# Patient Record
Sex: MALE | Race: OTHER | HISPANIC OR LATINO | Employment: FULL TIME | URBAN - METROPOLITAN AREA
[De-identification: names, ages, dates, MRNs, and addresses within clinical notes are randomized per-mention and may not be internally consistent; named-entity substitution may affect disease eponyms.]

---

## 2017-04-20 ENCOUNTER — APPOINTMENT (OUTPATIENT)
Dept: URGENT CARE | Facility: MEDICAL CENTER | Age: 32
End: 2017-04-20

## 2017-04-20 PROCEDURE — 99284 EMERGENCY DEPT VISIT MOD MDM: CPT

## 2017-04-20 PROCEDURE — G0383 LEV 4 HOSP TYPE B ED VISIT: HCPCS

## 2020-01-25 ENCOUNTER — APPOINTMENT (OUTPATIENT)
Dept: URGENT CARE | Facility: MEDICAL CENTER | Age: 35
End: 2020-01-25
Payer: OTHER MISCELLANEOUS

## 2020-01-25 PROCEDURE — 99283 EMERGENCY DEPT VISIT LOW MDM: CPT | Performed by: PHYSICIAN ASSISTANT

## 2020-01-25 PROCEDURE — G0382 LEV 3 HOSP TYPE B ED VISIT: HCPCS | Performed by: PHYSICIAN ASSISTANT

## 2020-01-30 ENCOUNTER — APPOINTMENT (OUTPATIENT)
Dept: URGENT CARE | Facility: MEDICAL CENTER | Age: 35
End: 2020-01-30
Payer: OTHER MISCELLANEOUS

## 2020-01-30 PROCEDURE — 99213 OFFICE O/P EST LOW 20 MIN: CPT | Performed by: PHYSICIAN ASSISTANT

## 2020-02-07 ENCOUNTER — OCCMED (OUTPATIENT)
Dept: URGENT CARE | Facility: MEDICAL CENTER | Age: 35
End: 2020-02-07
Payer: OTHER MISCELLANEOUS

## 2020-02-07 DIAGNOSIS — M54.2 NECK PAIN: Primary | ICD-10-CM

## 2020-02-07 PROCEDURE — 99213 OFFICE O/P EST LOW 20 MIN: CPT | Performed by: PHYSICIAN ASSISTANT

## 2020-02-14 ENCOUNTER — APPOINTMENT (OUTPATIENT)
Dept: URGENT CARE | Facility: MEDICAL CENTER | Age: 35
End: 2020-02-14
Payer: OTHER MISCELLANEOUS

## 2020-02-14 PROCEDURE — 99213 OFFICE O/P EST LOW 20 MIN: CPT | Performed by: FAMILY MEDICINE

## 2020-02-28 ENCOUNTER — APPOINTMENT (OUTPATIENT)
Dept: URGENT CARE | Facility: MEDICAL CENTER | Age: 35
End: 2020-02-28
Payer: OTHER MISCELLANEOUS

## 2020-02-28 PROCEDURE — 99213 OFFICE O/P EST LOW 20 MIN: CPT | Performed by: FAMILY MEDICINE

## 2020-03-02 ENCOUNTER — APPOINTMENT (OUTPATIENT)
Dept: URGENT CARE | Facility: MEDICAL CENTER | Age: 35
End: 2020-03-02
Payer: OTHER MISCELLANEOUS

## 2020-03-02 PROCEDURE — 99213 OFFICE O/P EST LOW 20 MIN: CPT | Performed by: PHYSICIAN ASSISTANT

## 2020-03-06 ENCOUNTER — APPOINTMENT (OUTPATIENT)
Dept: URGENT CARE | Facility: MEDICAL CENTER | Age: 35
End: 2020-03-06
Payer: OTHER MISCELLANEOUS

## 2020-03-06 PROCEDURE — 99213 OFFICE O/P EST LOW 20 MIN: CPT | Performed by: FAMILY MEDICINE

## 2020-03-12 ENCOUNTER — APPOINTMENT (OUTPATIENT)
Dept: URGENT CARE | Facility: CLINIC | Age: 35
End: 2020-03-12
Payer: OTHER MISCELLANEOUS

## 2020-03-12 PROCEDURE — 99213 OFFICE O/P EST LOW 20 MIN: CPT | Performed by: PHYSICIAN ASSISTANT

## 2020-04-08 ENCOUNTER — OFFICE VISIT (OUTPATIENT)
Dept: URGENT CARE | Facility: MEDICAL CENTER | Age: 35
End: 2020-04-08
Payer: COMMERCIAL

## 2020-04-08 VITALS
RESPIRATION RATE: 16 BRPM | TEMPERATURE: 98.3 F | SYSTOLIC BLOOD PRESSURE: 147 MMHG | OXYGEN SATURATION: 97 % | HEART RATE: 72 BPM | WEIGHT: 185 LBS | DIASTOLIC BLOOD PRESSURE: 98 MMHG | BODY MASS INDEX: 25.06 KG/M2 | HEIGHT: 72 IN

## 2020-04-08 DIAGNOSIS — J02.9 SORE THROAT: Primary | ICD-10-CM

## 2020-04-08 LAB — S PYO AG THROAT QL: NEGATIVE

## 2020-04-08 PROCEDURE — G0382 LEV 3 HOSP TYPE B ED VISIT: HCPCS | Performed by: PHYSICIAN ASSISTANT

## 2020-04-08 PROCEDURE — 87880 STREP A ASSAY W/OPTIC: CPT | Performed by: PHYSICIAN ASSISTANT

## 2020-05-02 ENCOUNTER — OFFICE VISIT (OUTPATIENT)
Dept: URGENT CARE | Facility: MEDICAL CENTER | Age: 35
End: 2020-05-02
Payer: COMMERCIAL

## 2020-05-02 VITALS
WEIGHT: 180 LBS | BODY MASS INDEX: 24.38 KG/M2 | OXYGEN SATURATION: 96 % | RESPIRATION RATE: 16 BRPM | TEMPERATURE: 97.7 F | DIASTOLIC BLOOD PRESSURE: 78 MMHG | HEART RATE: 75 BPM | HEIGHT: 72 IN | SYSTOLIC BLOOD PRESSURE: 118 MMHG

## 2020-05-02 DIAGNOSIS — S39.012A STRAIN OF LUMBAR REGION, INITIAL ENCOUNTER: Primary | ICD-10-CM

## 2020-05-02 PROCEDURE — G0382 LEV 3 HOSP TYPE B ED VISIT: HCPCS | Performed by: PHYSICIAN ASSISTANT

## 2020-05-02 RX ORDER — METHOCARBAMOL 750 MG/1
750 TABLET, FILM COATED ORAL 4 TIMES DAILY PRN
COMMUNITY
Start: 2020-05-01 | End: 2020-06-06 | Stop reason: ALTCHOICE

## 2020-05-02 RX ORDER — IBUPROFEN 600 MG/1
600 TABLET ORAL EVERY 6 HOURS PRN
COMMUNITY
Start: 2020-05-01 | End: 2021-05-01

## 2020-05-02 RX ORDER — LIDOCAINE 4 G/G
1 PATCH TOPICAL DAILY
COMMUNITY
Start: 2020-05-01 | End: 2020-06-06 | Stop reason: ALTCHOICE

## 2020-06-06 ENCOUNTER — OFFICE VISIT (OUTPATIENT)
Dept: URGENT CARE | Facility: MEDICAL CENTER | Age: 35
End: 2020-06-06
Payer: COMMERCIAL

## 2020-06-06 VITALS
HEIGHT: 72 IN | WEIGHT: 190 LBS | OXYGEN SATURATION: 96 % | DIASTOLIC BLOOD PRESSURE: 77 MMHG | HEART RATE: 72 BPM | SYSTOLIC BLOOD PRESSURE: 126 MMHG | TEMPERATURE: 97.3 F | RESPIRATION RATE: 16 BRPM | BODY MASS INDEX: 25.73 KG/M2

## 2020-06-06 DIAGNOSIS — M79.644 PAIN OF FINGER OF RIGHT HAND: Primary | ICD-10-CM

## 2020-06-06 PROCEDURE — G0382 LEV 3 HOSP TYPE B ED VISIT: HCPCS | Performed by: PHYSICIAN ASSISTANT

## 2020-06-06 RX ORDER — PREDNISONE 50 MG/1
50 TABLET ORAL DAILY
Qty: 5 TABLET | Refills: 0 | Status: SHIPPED | OUTPATIENT
Start: 2020-06-06 | End: 2020-06-11

## 2020-06-11 VITALS
DIASTOLIC BLOOD PRESSURE: 80 MMHG | WEIGHT: 190 LBS | SYSTOLIC BLOOD PRESSURE: 125 MMHG | BODY MASS INDEX: 25.73 KG/M2 | HEART RATE: 67 BPM | HEIGHT: 72 IN

## 2020-06-11 DIAGNOSIS — M79.644 PAIN OF FINGER OF RIGHT HAND: ICD-10-CM

## 2020-06-11 PROCEDURE — 99203 OFFICE O/P NEW LOW 30 MIN: CPT | Performed by: EMERGENCY MEDICINE

## 2020-06-11 RX ORDER — METHYLPREDNISOLONE 4 MG/1
TABLET ORAL
Qty: 1 EACH | Refills: 0 | Status: SHIPPED | OUTPATIENT
Start: 2020-06-11

## 2020-06-25 ENCOUNTER — APPOINTMENT (OUTPATIENT)
Dept: RADIOLOGY | Facility: MEDICAL CENTER | Age: 35
End: 2020-06-25
Payer: COMMERCIAL

## 2020-06-25 ENCOUNTER — APPOINTMENT (OUTPATIENT)
Dept: LAB | Facility: CLINIC | Age: 35
End: 2020-06-25
Payer: COMMERCIAL

## 2020-06-25 VITALS — WEIGHT: 190 LBS | HEIGHT: 72 IN | RESPIRATION RATE: 18 BRPM | TEMPERATURE: 97.9 F | BODY MASS INDEX: 25.73 KG/M2

## 2020-06-25 DIAGNOSIS — M79.644 FINGER PAIN, RIGHT: ICD-10-CM

## 2020-06-25 DIAGNOSIS — M79.644 FINGER PAIN, RIGHT: Primary | ICD-10-CM

## 2020-06-25 DIAGNOSIS — M25.541 PAIN INVOLVING JOINT OF FINGER OF RIGHT HAND: ICD-10-CM

## 2020-06-25 LAB
BASOPHILS # BLD AUTO: 0.05 THOUSANDS/ΜL (ref 0–0.1)
BASOPHILS NFR BLD AUTO: 1 % (ref 0–1)
CRP SERPL QL: <3 MG/L
EOSINOPHIL # BLD AUTO: 0.02 THOUSAND/ΜL (ref 0–0.61)
EOSINOPHIL NFR BLD AUTO: 0 % (ref 0–6)
ERYTHROCYTE [DISTWIDTH] IN BLOOD BY AUTOMATED COUNT: 12.5 % (ref 11.6–15.1)
ERYTHROCYTE [SEDIMENTATION RATE] IN BLOOD: 6 MM/HOUR (ref 0–10)
HCT VFR BLD AUTO: 43.8 % (ref 36.5–49.3)
HGB BLD-MCNC: 15 G/DL (ref 12–17)
IMM GRANULOCYTES # BLD AUTO: 0.01 THOUSAND/UL (ref 0–0.2)
IMM GRANULOCYTES NFR BLD AUTO: 0 % (ref 0–2)
LYMPHOCYTES # BLD AUTO: 1.46 THOUSANDS/ΜL (ref 0.6–4.47)
LYMPHOCYTES NFR BLD AUTO: 27 % (ref 14–44)
MCH RBC QN AUTO: 30.4 PG (ref 26.8–34.3)
MCHC RBC AUTO-ENTMCNC: 34.2 G/DL (ref 31.4–37.4)
MCV RBC AUTO: 89 FL (ref 82–98)
MONOCYTES # BLD AUTO: 0.59 THOUSAND/ΜL (ref 0.17–1.22)
MONOCYTES NFR BLD AUTO: 11 % (ref 4–12)
NEUTROPHILS # BLD AUTO: 3.33 THOUSANDS/ΜL (ref 1.85–7.62)
NEUTS SEG NFR BLD AUTO: 61 % (ref 43–75)
NRBC BLD AUTO-RTO: 0 /100 WBCS
PLATELET # BLD AUTO: 255 THOUSANDS/UL (ref 149–390)
PMV BLD AUTO: 8.8 FL (ref 8.9–12.7)
RBC # BLD AUTO: 4.93 MILLION/UL (ref 3.88–5.62)
WBC # BLD AUTO: 5.46 THOUSAND/UL (ref 4.31–10.16)

## 2020-06-25 PROCEDURE — 99204 OFFICE O/P NEW MOD 45 MIN: CPT | Performed by: ORTHOPAEDIC SURGERY

## 2020-06-25 PROCEDURE — 86200 CCP ANTIBODY: CPT

## 2020-06-25 PROCEDURE — 36415 COLL VENOUS BLD VENIPUNCTURE: CPT

## 2020-06-25 PROCEDURE — 86618 LYME DISEASE ANTIBODY: CPT

## 2020-06-25 PROCEDURE — 85025 COMPLETE CBC W/AUTO DIFF WBC: CPT

## 2020-06-25 PROCEDURE — 86140 C-REACTIVE PROTEIN: CPT

## 2020-06-25 PROCEDURE — 86430 RHEUMATOID FACTOR TEST QUAL: CPT

## 2020-06-25 PROCEDURE — 73140 X-RAY EXAM OF FINGER(S): CPT

## 2020-06-25 PROCEDURE — 85652 RBC SED RATE AUTOMATED: CPT

## 2020-06-25 NOTE — PROGRESS NOTES
Chief Complaint     Right index finger pain    History of Present Illness     José Miguel Gallegos is a 30 y o  male who presents the office today for evaluation of his right index finger  I am seeing him in consultation at the request of Stoney Loja MD  Patient states he has had right index finger pain for approximately 2 months now with no known incident of injury  Patient states the pain is at his PIP joint, and denies any clicking, popping, or locking of the finger  He works as a  at [employer] and notes his pain is worse with increased activities  He was prescribed a medrol dose pack and advised to buddy tape his fingers for increased activity  He reports this did not provide him with significant relief  He denies any other joint pains  He denies any weight loss  He denies any numbness and tingling  Patient has had x-rays of his finger in the past with an outside provider but does not have them available for review today  History reviewed  No pertinent past medical history  History reviewed  No pertinent surgical history  No Known Allergies    Current Outpatient Medications on File Prior to Visit   Medication Sig Dispense Refill    ibuprofen (MOTRIN) 600 mg tablet Take 600 mg by mouth every 6 (six) hours as needed      methylPREDNISolone 4 MG tablet therapy pack Use as directed on package 1 each 0     No current facility-administered medications on file prior to visit  Social History     Tobacco Use    Smoking status: Former Smoker    Smokeless tobacco: Never Used   Substance Use Topics    Alcohol use: No    Drug use: No       History reviewed  No pertinent family history  Review of Systems     As stated in the HPI  All other systems were reviewed and are negative  Physical Exam     Temp 97 9 °F (36 6 °C)   Resp 18   Ht 6' (1 829 m)   Wt 86 2 kg (190 lb)   BMI 25 77 kg/m²     GENERAL: This is a well-developed, well-nourished, age-appropriate patient in no acute distress  The patient is alert and oriented x3  Pleasant and cooperative  Eyes: Anicteric sclerae  Extraocular movements appear intact  HENT: Nares are patent with no drainage  Lungs: There is equal chest rise on inspection  Breathing is non-labored with no audible wheezing  Cardiovascular: No cyanosis  No upper extremity lymphadema  Skin: Skin is warm to touch  No obvious skin lesions or rashes other than described below  Neurologic: No ataxia  Psychiatric: Mood and affect are appropriate  Right index finger  Skin is intact  No erythema or ecchymosis noted  Swelling of the PIP joint   Tender to palpate PIP joint and the radial and ulnar aspect of the joint as well as on the volar and dorsal aspect  Nontender to palpate A1 pulley, no active triggering noted  Stable collateral ligament exam with varus and valgus stress  No hyperextension  DPC 0  No flexion contracture  Sensation intact to the ulnar and radial aspect of the digit  Brisk capillary refill noted    Data Review     Results Reviewed     None             Imaging:  X-rays of the right index finger obtained on 06/25/2020 were personally reviewed by me in the office and demonstrate no acute fracture or osseous abnormality     Assessment and Plan      Diagnoses and all orders for this visit:    Finger pain, right  -     XR finger right second digit-index; Future    Pain involving joint of finger of right hand  -     CBC and differential; Future  -     Sedimentation rate, automated; Future  -     C-reactive protein; Future  -     Cyclic citrul peptide antibody, IgG; Future  -     Rheumatoid Factor (IgG); Future  -     Lyme Antibody Profile with reflex to WB; Future           45-year-old male with right index PIP joint pain  Patient and I discussed due to his young age, no mechanism of injury and negative x-rays I would like to order blood work to work him up for other conditions  Patient was amenable to this plan  Labs were ordered today in the office    He may use a Coban wrap for comfort around PIP joint for activity  At this point time he has no restrictions and may continue all activities as tolerated  If his labs come back negative we may consider doing a corticosteroid injection to the joint for pain relief    Will follow up in 2 weeks time for re-evaluation    Follow Up: 2 weeks     To Do Next Visit:  Review labs, potential corticosteroid injections    PROCEDURES PERFORMED:  Procedures  No Procedures performed today    Scribe Attestation    I,:   Indira Garza MA am acting as a scribe while in the presence of the attending physician :        I,:   Mp Patton MD personally performed the services described in this documentation    as scribed in my presence :

## 2020-06-26 LAB
B BURGDOR IGG+IGM SER-ACNC: <0.91 ISR (ref 0–0.9)
RHEUMATOID FACT SER QL LA: NEGATIVE

## 2020-06-27 LAB — CCP IGA+IGG SERPL IA-ACNC: 6 UNITS (ref 0–19)

## 2020-06-29 ENCOUNTER — APPOINTMENT (OUTPATIENT)
Dept: URGENT CARE | Facility: MEDICAL CENTER | Age: 35
End: 2020-06-29
Payer: OTHER MISCELLANEOUS

## 2020-06-29 PROCEDURE — 99284 EMERGENCY DEPT VISIT MOD MDM: CPT | Performed by: FAMILY MEDICINE

## 2020-06-29 PROCEDURE — G0383 LEV 4 HOSP TYPE B ED VISIT: HCPCS | Performed by: FAMILY MEDICINE

## 2020-07-02 ENCOUNTER — APPOINTMENT (OUTPATIENT)
Dept: URGENT CARE | Facility: MEDICAL CENTER | Age: 35
End: 2020-07-02
Payer: OTHER MISCELLANEOUS

## 2020-07-02 PROCEDURE — 99213 OFFICE O/P EST LOW 20 MIN: CPT | Performed by: FAMILY MEDICINE

## 2020-07-09 ENCOUNTER — APPOINTMENT (OUTPATIENT)
Dept: URGENT CARE | Facility: MEDICAL CENTER | Age: 35
End: 2020-07-09
Payer: OTHER MISCELLANEOUS

## 2020-07-09 VITALS — WEIGHT: 190 LBS | HEIGHT: 72 IN | TEMPERATURE: 98.2 F | BODY MASS INDEX: 25.73 KG/M2

## 2020-07-09 DIAGNOSIS — M79.644 FINGER PAIN, RIGHT: Primary | ICD-10-CM

## 2020-07-09 PROCEDURE — 99213 OFFICE O/P EST LOW 20 MIN: CPT | Performed by: ORTHOPAEDIC SURGERY

## 2020-07-09 PROCEDURE — 99213 OFFICE O/P EST LOW 20 MIN: CPT | Performed by: PHYSICIAN ASSISTANT

## 2020-07-09 RX ORDER — METHOCARBAMOL 500 MG/1
500 TABLET, FILM COATED ORAL 2 TIMES DAILY
COMMUNITY
Start: 2020-06-30 | End: 2020-09-30 | Stop reason: SDUPTHER

## 2020-07-09 NOTE — PROGRESS NOTES
Chief Complaint     Right index finger pain    History of Present Illness     Cameron Campos is a 30 y o  male who presents the office today for a follow-up evaluation of his right index finger  Patient states he continues to experience pain at the PIP joint though it is markedly decreased  He has been using a Coban wrap for comfort  He was able to obtain blood work prior to today's visit  Patient states he continues to have ulnar-sided PIP joint pain  He notes his pain has greatly improved since the last visit, however he has not been working due to a back injury  He attributes his decreasing pain to decrease in work activity  He notes today that his range of motion has improved in that he has no pain with making a full fist   He denies any new injuries  He denies any distal paresthesias  No past medical history on file  Past Surgical History:   Procedure Laterality Date    BACK SURGERY         No Known Allergies    Current Outpatient Medications on File Prior to Visit   Medication Sig Dispense Refill    ibuprofen (MOTRIN) 600 mg tablet Take 600 mg by mouth every 6 (six) hours as needed      methocarbamol (ROBAXIN) 500 mg tablet Take 500 mg by mouth 2 (two) times a day      methylPREDNISolone 4 MG tablet therapy pack Use as directed on package (Patient not taking: Reported on 7/9/2020) 1 each 0     No current facility-administered medications on file prior to visit  Social History     Tobacco Use    Smoking status: Former Smoker    Smokeless tobacco: Never Used   Substance Use Topics    Alcohol use: No    Drug use: No       No family history on file  Review of Systems     As stated in the HPI  All other systems were reviewed and are negative  Physical Exam     Temp 98 2 °F (36 8 °C)   Ht 6' (1 829 m)   Wt 86 2 kg (190 lb)   BMI 25 77 kg/m²     GENERAL: This is a well-developed, well-nourished, age-appropriate patient in no acute distress   The patient is alert and oriented x3  Pleasant and cooperative  Eyes: Anicteric sclerae  Extraocular movements appear intact  HENT: Nares are patent with no drainage  Lungs: There is equal chest rise on inspection  Breathing is non-labored with no audible wheezing  Cardiovascular: No cyanosis  No upper extremity lymphadema  Skin: Skin is warm to touch  No obvious skin lesions or rashes other than described below  Neurologic: No ataxia  Psychiatric: Mood and affect are appropriate  Right index finger  Skin intact  No erythema or ecchymosis noted  No swelling noted  Tender to palpate ulnar collateral ligament of PIP joint  Stable to valgus and varus stress  DPC 0  Sensation intact to the ulnar and radial aspect of the digit  Brisk capillary refill noted    Data Review     Results Reviewed     None             Imaging:  None today     Assessment and Plan      Diagnoses and all orders for this visit:    Finger pain, right    Other orders  -     methocarbamol (ROBAXIN) 500 mg tablet; Take 500 mg by mouth 2 (two) times a day           30year-old with right index finger PIP joint pain  We reviewed his lab work today which did not demonstrate any rheumatoid arthritis or other concerning issue  Patient and I discussed that because he has had improvement in his pain with activity modification that we do not need to proceed with corticosteroid injection today  Patient wishes to defer this injection until he is closer to return to work if it is still bothering him  He will follow up with me on an as-needed basis        Follow Up:  Prior to going back to work, overbook if needed for injection only    To Do Next Visit:  Injection of PIP    PROCEDURES PERFORMED:  Procedures  No Procedures performed today     Scribe Attestation    I,:   Willis Clayton MA am acting as a scribe while in the presence of the attending physician :        I,:   Chio Parsons MD personally performed the services described in this documentation    as scribed in my presence :

## 2020-07-22 ENCOUNTER — EVALUATION (OUTPATIENT)
Dept: PHYSICAL THERAPY | Facility: MEDICAL CENTER | Age: 35
End: 2020-07-22
Payer: OTHER MISCELLANEOUS

## 2020-07-22 DIAGNOSIS — M54.6 ACUTE BILATERAL THORACIC BACK PAIN: Primary | ICD-10-CM

## 2020-07-22 PROCEDURE — 97140 MANUAL THERAPY 1/> REGIONS: CPT | Performed by: PHYSICAL THERAPIST

## 2020-07-22 PROCEDURE — 97161 PT EVAL LOW COMPLEX 20 MIN: CPT | Performed by: PHYSICAL THERAPIST

## 2020-07-24 NOTE — PROGRESS NOTES
PT Evaluation     Today's date: 2020  Patient name: Jacqui Whitfield  : 1985  MRN: [id number]  Referring provider: Sunitha Abbott PA-C  Dx:   Encounter Diagnosis     ICD-10-CM    1  Acute bilateral thoracic back pain M54 6                   Assessment  Assessment details: Jacqui Whitfield is a 30 y o  male was evaluated on 2020  for Acute bilateral thoracic back pain  (primary encounter diagnosis)  Jacqui Whitfield has the above listed impairments resulting in functional deficits and negative impact to quality of life  Patient is appropriate for skilled PT intervention to promote maximal return to function and patient specific goals  Patient agrees with outlined treatment plan and all questions were answered to their satisfaction  Impairments: abnormal muscle firing, abnormal muscle tone, abnormal or restricted ROM, impaired physical strength, lacks appropriate home exercise program and pain with function  Understanding of Dx/Px/POC: good   Prognosis: good    Goals  Patient will successfully transition to home exercise program   Patient will be able to manage symptoms independently  Rubina Ngo will report sleeping through night and not waking due to back pain  Patient will return to work activity in 4-6 weeks without limitation in lifting tolerance       Plan  Patient would benefit from: skilled PT  Referral necessary: No  Planned modality interventions: thermotherapy: hydrocollator packs  Planned therapy interventions: home exercise program, manual therapy, neuromuscular re-education, patient education, functional ROM exercises, strengthening, stretching, joint mobilization, graded activity, graded exercise, therapeutic exercise, body mechanics training, motor coordination training and activity modification  Frequency: 2x week  Duration in weeks: 12  Treatment plan discussed with: patient        Subjective Evaluation    History of Present Illness  Mechanism of injury: Jacqui Whitfield is a 30 y o  male presenting to therapy with complaints of mid/lower back pain after lifting on   He reports he lifted and object and his back began tightening up  He denies any lower extremity symptoms, no numbness, no bowel/bladder dysfunction  Has been attempting to manage with muscle relaxers and stretching  Hx of laminectomy 2 years ago at L4  Feels most of discomfort and tightness in mid back with laying on back or when being active  Currently out of work as  due to pain and not being able to fully perform duties  Pain  Current pain ratin  At best pain rating: 3  At worst pain ratin  Quality: dull ache and pressure          Objective     Concurrent Complaints  Negative for night pain, disturbed sleep, bladder dysfunction, bowel dysfunction and saddle (S4) numbness    Postural Observations  Seated posture: fair  Standing posture: fair        Neurological Testing     Sensation     Lumbar   Left   Intact: light touch    Right   Intact: light touch    Reflexes   Left   Patellar (L4): normal (2+)  Achilles (S1): normal (2+)    Right   Patellar (L4): normal (2+)  Achilles (S1): normal (2+)    Active Range of Motion   Cervical/Thoracic Spine       Thoracic    Flexion:  with pain Restriction level: moderate  Extension:  with pain Restriction level: moderate  Left lateral flexion:  Restriction level: minimal  Right lateral flexion:  Restriction level: minimal  Left rotation:  Restriction level: minimal  Right rotation:  Restriction level: minimal    Lumbar   Flexion:  Restriction level: minimal  Extension:  Restriction level: minimal  Left lateral flexion:  Restriction level: minimal  Right lateral flexion:  Restriction level: minimal    Joint Play     Hypomobile: T10, T11, T12, L1 and L2     Tests     Lumbar     Left   Negative crossed SLR, passive SLR and quadrant  Right   Negative crossed SLR, passive SLR and quadrant  Left Hip   Negative KVNG and FADIR       Right Hip   Negative KVNG and ERMIAS         Flowsheet Rows      Most Recent Value   PT/OT G-Codes   Current Score  48   Projected Score  66             Precautions: Hx Laminectomy L4 2018      Manuals 7/22            Seated thoracic distraction thrust  AF                                                   Neuro Re-Ed                                                                                                        Ther Ex             Double knee to chest             Quadruped rocking                                                                                            Ther Activity                                       Gait Training                                       Modalities

## 2020-07-27 ENCOUNTER — OFFICE VISIT (OUTPATIENT)
Dept: PHYSICAL THERAPY | Facility: MEDICAL CENTER | Age: 35
End: 2020-07-27
Payer: OTHER MISCELLANEOUS

## 2020-07-27 DIAGNOSIS — M54.6 ACUTE BILATERAL THORACIC BACK PAIN: Primary | ICD-10-CM

## 2020-07-27 PROCEDURE — 97140 MANUAL THERAPY 1/> REGIONS: CPT | Performed by: PHYSICAL THERAPIST

## 2020-07-27 PROCEDURE — 97112 NEUROMUSCULAR REEDUCATION: CPT | Performed by: PHYSICAL THERAPIST

## 2020-07-27 NOTE — PROGRESS NOTES
Daily Note     Today's date: 2020  Patient name: Luis Felipe Hicks  : 1985  MRN: [id number]  Referring provider: Onel Chicas PA-C  Dx:   Encounter Diagnosis     ICD-10-CM    1  Acute bilateral thoracic back pain M54 6                   Subjective: Pt reports that he was sore after last visit but it only lasted about a day  Stated he has been doing the stretches that are helping a little  Objective: See treatment diary below      Assessment: Tolerated treatment well  Patient demonstrated fatigue post treatment, exhibited good technique with therapeutic exercises and would benefit from continued PT  Pt has moderate tissue irritability in mid back regions with tissue massage and moderate grade joint mobilizations  Pt demonstrates moderate to high muscular fatigue with exercises  Passive scapular mobility is poor and pt had increased irritability around left medial scapular boarder  Progress motor control, muscular endurance, and muscle lengthening exercises  Plan: Continue per plan of care        Precautions: Hx Laminectomy L4 2018      Manuals            Seated thoracic distraction thrust  AF            Scapular mobility   CK           Thoracic Pas  Gr III-IV                        Neuro Re-Ed             Hip hinge   CK            Modified deadlift  Step, yellow KB                                                                             Ther Ex             Double knee to chest  HEP           Quadruped rocking   CK w/ soft tisue mob           Arm extension  Red, 15x 5s hold                                                                            Ther Activity                                       Gait Training                                       Modalities No

## 2020-07-28 ENCOUNTER — APPOINTMENT (OUTPATIENT)
Dept: URGENT CARE | Facility: MEDICAL CENTER | Age: 35
End: 2020-07-28
Payer: OTHER MISCELLANEOUS

## 2020-07-28 PROCEDURE — 99213 OFFICE O/P EST LOW 20 MIN: CPT | Performed by: FAMILY MEDICINE

## 2020-08-05 ENCOUNTER — OFFICE VISIT (OUTPATIENT)
Dept: PHYSICAL THERAPY | Facility: MEDICAL CENTER | Age: 35
End: 2020-08-05
Payer: OTHER MISCELLANEOUS

## 2020-08-05 DIAGNOSIS — M54.6 ACUTE BILATERAL THORACIC BACK PAIN: Primary | ICD-10-CM

## 2020-08-05 PROCEDURE — 97110 THERAPEUTIC EXERCISES: CPT | Performed by: PHYSICAL THERAPIST

## 2020-08-05 PROCEDURE — 97112 NEUROMUSCULAR REEDUCATION: CPT | Performed by: PHYSICAL THERAPIST

## 2020-08-05 PROCEDURE — 97140 MANUAL THERAPY 1/> REGIONS: CPT | Performed by: PHYSICAL THERAPIST

## 2020-08-05 NOTE — PROGRESS NOTES
Daily Note     Today's date: 2020  Patient name: Sharyle Cords  : 1985  MRN: [id number]  Referring provider: Artist Litten, PA-C  Dx:   Encounter Diagnosis     ICD-10-CM    1  Acute bilateral thoracic back pain  M54 6                   Subjective: Pt states that he feels as though he's reached a "plateau" and feels as though it's not any worse, but not any better  Objective: See treatment diary below      Assessment: Tolerated treatment well  Patient demonstrated fatigue post treatment, exhibited good technique with therapeutic exercises and would benefit from continued PT  Pt is responding well to manual therapy  Pt struggled with activation of TA with core stabilization exercise initiated this visit  Pt required cuing to engage correct mm's w/ex's  Pt fatigued at end of session, but no increased sharp pain  Plan: Continue per plan of care        Precautions: Hx Laminectomy L4 2018      Manuals  8/5          Seated thoracic distraction thrust  AF            Scapular mobility   CK KO          Thoracic Pas  Gr III-IV NP                       Neuro Re-Ed             Hip hinge   CK            Modified deadlift  Step, yellow KB  Step  Yellow  KB  2x10          Banded squats   Pink  2x10          Core stabilization   5"  3x10                                                 Ther Ex             Double knee to chest  HEP           Quadruped rocking   CK w/ soft tisue mob KO          Arm extension  Red, 15x 5s hold Red  2x10                                                                           Ther Activity                                       Gait Training                                       Modalities

## 2020-08-10 ENCOUNTER — APPOINTMENT (OUTPATIENT)
Dept: URGENT CARE | Facility: MEDICAL CENTER | Age: 35
End: 2020-08-10
Payer: OTHER MISCELLANEOUS

## 2020-08-10 ENCOUNTER — OFFICE VISIT (OUTPATIENT)
Dept: PHYSICAL THERAPY | Facility: MEDICAL CENTER | Age: 35
End: 2020-08-10
Payer: OTHER MISCELLANEOUS

## 2020-08-10 DIAGNOSIS — M54.6 ACUTE BILATERAL THORACIC BACK PAIN: Primary | ICD-10-CM

## 2020-08-10 PROCEDURE — 97110 THERAPEUTIC EXERCISES: CPT | Performed by: PHYSICAL THERAPIST

## 2020-08-10 PROCEDURE — 99213 OFFICE O/P EST LOW 20 MIN: CPT | Performed by: FAMILY MEDICINE

## 2020-08-10 PROCEDURE — 97140 MANUAL THERAPY 1/> REGIONS: CPT | Performed by: PHYSICAL THERAPIST

## 2020-08-10 NOTE — PROGRESS NOTES
Daily Note     Today's date: 8/10/2020  Patient name: Chrissie Vidales  : 1985  MRN: [id number]  Referring provider: Shaun Graham PA-C  Dx:   Encounter Diagnosis     ICD-10-CM    1  Acute bilateral thoracic back pain  M54 6                   Subjective: Pt reports he had some increased soreness over the weekend from putting a bed together and shopping  He states today he is not as sore  Objective: See treatment diary below      Assessment: Tolerated treatment well  Patient demonstrated fatigue post treatment  Pt demonstrates thoracic hypomobility with manual therapy and moderate irritability  Thoracic rotation is decreased and is being addressed with manual therapy and therapeutic exercise  Hamstring muscle length is limited contributing to poor lifting mechanics which is being address with neuromuscular re-education  Plan: Continue per plan of care        Precautions: Hx Laminectomy L4 2018      Manuals  8/10         Seated thoracic distraction thrust  AF            Scapular mobility   CK KO          Thoracic Pas  Gr III-IV NP Gr III         Thoracic pistol thrust    CK         Neuro Re-Ed             Hip hinge   CK   30         Modified deadlift  Step, yellow KB  Step  Yellow  KB  2x10 20, 10#         Banded squats   Pink  2x10 np         Core stabilization   5"  3x10 np         Bird dog    5s x10                                   Ther Ex             Double knee to chest  HEP           Quadruped rocking   CK w/ soft tisue mob KO Cnp         Arm extension  Red, 15x 5s hold Red  2x10 Blue, 20         Lat stretch    3x30s ea         Cat / cow    20                                                Ther Activity                                       Gait Training                                       Modalities

## 2020-08-12 ENCOUNTER — OFFICE VISIT (OUTPATIENT)
Dept: PHYSICAL THERAPY | Facility: MEDICAL CENTER | Age: 35
End: 2020-08-12
Payer: OTHER MISCELLANEOUS

## 2020-08-12 DIAGNOSIS — M54.6 ACUTE BILATERAL THORACIC BACK PAIN: Primary | ICD-10-CM

## 2020-08-12 PROCEDURE — 97110 THERAPEUTIC EXERCISES: CPT | Performed by: PHYSICAL THERAPIST

## 2020-08-12 PROCEDURE — 97140 MANUAL THERAPY 1/> REGIONS: CPT | Performed by: PHYSICAL THERAPIST

## 2020-08-12 NOTE — PROGRESS NOTES
Daily Note     Today's date: 2020  Patient name: Sandra Chavarria  : 1985  MRN: [id number]  Referring provider: Henry Mcmanus PA-C  Dx:   Encounter Diagnosis     ICD-10-CM    1  Acute bilateral thoracic back pain  M54 6                   Subjective: Pt states he got mild relief in symptoms after last visit  He states his back gets tired and he feels like it is bruised  Objective: See treatment diary below      Assessment: Tolerated treatment well  Patient demonstrated fatigue post treatment  Pt demonstrates improved squatting and lifting mechanics  Pt exhibits decreased dynamic balance of uneven surfaces that will be addressed with neuromuscular re-education  Continue to progress strengthen and conditioning  Plan: Continue per plan of care        Precautions: Hx Laminectomy L4 2018      Manuals 7/22 7/27 8/5 8/10 8/12        Seated thoracic distraction thrust  AF            Scapular mobility   CK KO          Thoracic Pas  Gr III-IV NP Gr III Gr III        Thoracic pistol thrust    CK CK        Neuro Re-Ed             SL hip hinge  CK   30 2x10        Modified deadlift  Step, yellow KB  Step  Yellow  KB  2x10 20, 10# 20, 20#        Banded squats   Pink  2x10 np Blue, 2x15        Bird dog    5s x10 5ea, 5s        bosu squat     10 regress nv                    Ther Ex             Quadruped rocking   CK w/ soft tisue mob KO np np        Arm extension  Red, 15x 5s hold Red  2x10 Blue, 20 np        Lat stretch    3x30s ea np        Cat / cow    20 20        silvino backward / side walk     10#, 5/3/3 laps                                  Ther Activity                                       Gait Training                                       Modalities

## 2020-08-18 ENCOUNTER — OFFICE VISIT (OUTPATIENT)
Dept: PHYSICAL THERAPY | Facility: MEDICAL CENTER | Age: 35
End: 2020-08-18
Payer: OTHER MISCELLANEOUS

## 2020-08-18 DIAGNOSIS — M54.6 ACUTE BILATERAL THORACIC BACK PAIN: Primary | ICD-10-CM

## 2020-08-18 PROCEDURE — 97110 THERAPEUTIC EXERCISES: CPT | Performed by: PHYSICAL THERAPIST

## 2020-08-18 PROCEDURE — 97140 MANUAL THERAPY 1/> REGIONS: CPT | Performed by: PHYSICAL THERAPIST

## 2020-08-19 NOTE — PROGRESS NOTES
Daily Note     Today's date: 2020  Patient name: Viet Montero  : 1985  MRN: [id number]  Referring provider: Lyric Maldonado PA-C  Dx:   Encounter Diagnosis     ICD-10-CM    1  Acute bilateral thoracic back pain  M54 6                   Subjective: Balbir Sarmiento reports that he is doing fairly well, some right sided sharp thoracic pain       Objective: See treatment diary below      Assessment: Tolerated treatment well  Patient with resolved sharp pain post supine thoracic R rotation thrust   Pain free self mobility performed without issue afterwards  Plan: Continue per plan of care        Precautions: Hx Laminectomy L4 2018      Manuals 7/22 7/27 8/5 8/10 8/12 8/18       Seated thoracic distraction thrust  AF            Scapular mobility   CK KO          Thoracic Pas  Gr III-IV NP Gr III Gr III AF       Thoracic pistol thrust    CK CK AF with R rotation       Neuro Re-Ed             SL hip hinge  CK   30 2x10        Modified deadlift  Step, yellow KB  Step  Yellow  KB  2x10 20, 10# 20, 20#        Banded squats   Pink  2x10 np Blue, 2x15        Bird dog    5s x10 5ea, 5s        bosu squat     10 regress nv                    Ther Ex             Quadruped rocking   CK w/ soft tisue mob KO np np Thoracic rotation  20  Thoracic extension  20       Arm extension  Red, 15x 5s hold Red  2x10 Blue, 20 np        Lat stretch    3x30s ea np        Cat / cow    20 20        silvino backward / side walk     10#, 5/3/3 laps        Prone row      30       Prone extension      30       Ther Activity                                       Gait Training                                       Modalities

## 2020-08-21 ENCOUNTER — OFFICE VISIT (OUTPATIENT)
Dept: PHYSICAL THERAPY | Facility: MEDICAL CENTER | Age: 35
End: 2020-08-21
Payer: OTHER MISCELLANEOUS

## 2020-08-21 DIAGNOSIS — M54.6 ACUTE BILATERAL THORACIC BACK PAIN: Primary | ICD-10-CM

## 2020-08-21 PROCEDURE — 97110 THERAPEUTIC EXERCISES: CPT | Performed by: PHYSICAL THERAPIST

## 2020-08-21 NOTE — PROGRESS NOTES
Daily Note     Today's date: 2020  Patient name: Junior Richardson  : 1985  MRN: [id number]  Referring provider: Margoth Berumen PA-C  Dx:   Encounter Diagnosis     ICD-10-CM    1  Acute bilateral thoracic back pain  M54 6                   Subjective: Pt reports that he felt okay after last visit  Today so far he states that he feels stiff this morning  Objective: See treatment diary below      Assessment: Tolerated treatment well  Patient demonstrated fatigue post treatment and exhibited good technique with therapeutic exercises  Pt exhibited decreased thoracic mobility and stiffness that was addressed with manual therapy and therapeutic exercises  Pt gained relief and good response to treatment  Pt educated that if symptoms plateau to begin transitioning to HEP and gym program        Plan: Continue per plan of care        Precautions: Hx Laminectomy L4 2018      Manuals 7/22 7/27 8/5 8/10 8/12 8/18 8/21      Seated thoracic distraction thrust  AF            Scapular mobility   CK KO          Thoracic Pas  Gr III-IV NP Gr III Gr III AF CK      Thoracic pistol thrust    CK CK AF with R rotation       Neuro Re-Ed             SL hip hinge  CK   30 2x10        Modified deadlift  Step, yellow KB  Step  Yellow  KB  2x10 20, 10# 20, 20#        Banded squats   Pink  2x10 np Blue, 2x15        Bird dog    5s x10 5ea, 5s        bosu squat     10 regress nv                    Ther Ex             Quadruped rocking   CK w/ soft tisue mob KO np np Thoracic rotation  20  Thoracic extension  20       Arm extension  Red, 15x 5s hold Red  2x10 Blue, 20 np        Lat stretch    3x30s ea np  physioball, 20ea      Cat / cow    20 20  20      silvino backward / side walk     10#, 5/3/3 laps        Prone row      30       Prone extension      30       Ther Activity                                       Gait Training                                       Modalities

## 2020-08-25 ENCOUNTER — APPOINTMENT (OUTPATIENT)
Dept: RADIOLOGY | Facility: MEDICAL CENTER | Age: 35
End: 2020-08-25
Payer: OTHER MISCELLANEOUS

## 2020-08-25 ENCOUNTER — APPOINTMENT (OUTPATIENT)
Dept: URGENT CARE | Facility: MEDICAL CENTER | Age: 35
End: 2020-08-25
Payer: OTHER MISCELLANEOUS

## 2020-08-25 ENCOUNTER — OFFICE VISIT (OUTPATIENT)
Dept: PHYSICAL THERAPY | Facility: MEDICAL CENTER | Age: 35
End: 2020-08-25
Payer: OTHER MISCELLANEOUS

## 2020-08-25 DIAGNOSIS — M54.50 ACUTE LOW BACK PAIN, UNSPECIFIED BACK PAIN LATERALITY, UNSPECIFIED WHETHER SCIATICA PRESENT: ICD-10-CM

## 2020-08-25 DIAGNOSIS — M54.6 ACUTE BILATERAL THORACIC BACK PAIN: Primary | ICD-10-CM

## 2020-08-25 DIAGNOSIS — M54.50 ACUTE LOW BACK PAIN, UNSPECIFIED BACK PAIN LATERALITY, UNSPECIFIED WHETHER SCIATICA PRESENT: Primary | ICD-10-CM

## 2020-08-25 PROCEDURE — 97112 NEUROMUSCULAR REEDUCATION: CPT | Performed by: PHYSICAL THERAPIST

## 2020-08-25 PROCEDURE — 72100 X-RAY EXAM L-S SPINE 2/3 VWS: CPT

## 2020-08-25 PROCEDURE — 99213 OFFICE O/P EST LOW 20 MIN: CPT | Performed by: PREVENTIVE MEDICINE

## 2020-08-25 PROCEDURE — 72072 X-RAY EXAM THORAC SPINE 3VWS: CPT

## 2020-08-25 NOTE — PROGRESS NOTES
Daily Note     Today's date: 2020  Patient name: oTn Marcus  : 1985  MRN: [id number]  Referring provider: Luc Maria PA-C  Dx:   Encounter Diagnosis     ICD-10-CM    1  Acute bilateral thoracic back pain  M54 6                   Subjective: Pt reports that he has a "pressure" pain in his low back today  He states he still has mild to moderate pain when bending forward  Objective: See treatment diary below  90/90 hamstring length: 45 degrees bilateral   Passive Straight leg Raise: tension 45 degrees bilateral       Assessment: Tolerated treatment well  Patient exhibits significant limitations of hamstring length with potential nerve tension contributions that were addressed with nerve glides, neuromuscular reeducation and therapeutic exercises  Pt exhits limitation in forward flexion due to decreased muscle length of hamstrings  Forward flexion was improved after nerve glide series and hamstring stretching  Pt educated on HEP to incop      Plan: Continue per plan of care   Increase nerve mobility, hamstring length, and forward flexion     Precautions: Hx Laminectomy L4 2018      Manuals  8/5 8/10 8/12 8/18 8/21 8/25     Seated thoracic distraction thrust  AF       np     Scapular mobility   CK KO     np     Thoracic Pas  Gr III-IV NP Gr III Gr III AF CK CK     Thoracic pistol thrust    CK CK AF with R rotation  np     Nerve glide series        CK     Neuro Re-Ed             SL hip hinge  CK   30 2x10   np     Modified deadlift  Step, yellow KB  Step  Yellow  KB  2x10 20, 10# 20, 20#   20     Bird dog    5s x10 5ea, 5s   np                  Ther Ex             Quadruped rocking   CK w/ soft tisue mob KO np np Thoracic rotation  20  Thoracic extension  20  np     Arm extension  Red, 15x 5s hold Red  2x10 Blue, 20 np   np     Lat stretch    3x30s ea np  physioball, 20ea 10s x 5 ea     Cat / cow    20 20  20 np     Prone row      30  np     Seated hamstring stretch        3x30s Ther Activity                                       Gait Training                                       Modalities

## 2020-08-28 ENCOUNTER — OFFICE VISIT (OUTPATIENT)
Dept: PHYSICAL THERAPY | Facility: MEDICAL CENTER | Age: 35
End: 2020-08-28
Payer: OTHER MISCELLANEOUS

## 2020-08-28 DIAGNOSIS — M54.6 ACUTE BILATERAL THORACIC BACK PAIN: Primary | ICD-10-CM

## 2020-08-28 PROCEDURE — 97110 THERAPEUTIC EXERCISES: CPT | Performed by: PHYSICAL THERAPIST

## 2020-08-28 NOTE — PROGRESS NOTES
Daily Note     Today's date: 2020  Patient name: Jacqui Whitfield  : 1985  MRN: [id number]  Referring provider: Sunitha Abbott PA-C  Dx: No diagnosis found  Subjective: Pt reports feeling no lingering soreness after last session  He reports that he does feel better since he started  Objective: See treatment diary below      Assessment: Tolerated treatment well  Patient demonstrated fatigue post treatment  Pt requires verbal cueing for proper lifting mechanics for single leg deadlift form to not bend with his back  Pt demonstrates and subjectively reports decreased endurance with repetitive bending  Strength has mild to no limitations  Pt has mild to moderate limitations with forward bending that improves with nerve gliding and hamstring stretching  Continue progressing functional activity and endurance for lifting  Plan: Continue per plan of care         Precautions: Hx Laminectomy L4 2018      Manuals 7/22 7/27 8/5 8/10 8/12 8/18 8/21 8/25 8/28    Seated thoracic distraction thrust  AF       np     Scapular mobility   CK KO     np     Thoracic Pas  Gr III-IV NP Gr III Gr III AF CK CK     Thoracic pistol thrust    CK CK AF with R rotation  np     Nerve glide series        CK CK    Neuro Re-Ed             SL hip hinge  CK   30 2x10   np 15ea    Modified deadlift  Step, yellow KB  Step  Yellow  KB  2x10 20, 10# 20, 20#   20     Bird dog    5s x10 5ea, 5s   np                  Ther Ex             Quadruped rocking   CK w/ soft tisue mob KO np np Thoracic rotation  20  Thoracic extension  20  np     Arm extension  Red, 15x 5s hold Red  2x10 Blue, 20 np   np 2x15  10#    Lat stretch    3x30s ea np  physioball, 20ea 10s x 5 ea 3x30s    Cat / cow    20 20  20 np     Prone row      30  np     Seated hamstring stretch        3x30s 3x30s    Prone quad/HF stretch         3x30s  Green strap    Ther Activity                                       Gait Training Modalities

## 2020-09-02 ENCOUNTER — OFFICE VISIT (OUTPATIENT)
Dept: PHYSICAL THERAPY | Facility: MEDICAL CENTER | Age: 35
End: 2020-09-02
Payer: OTHER MISCELLANEOUS

## 2020-09-02 DIAGNOSIS — M54.6 ACUTE BILATERAL THORACIC BACK PAIN: Primary | ICD-10-CM

## 2020-09-02 PROCEDURE — 97530 THERAPEUTIC ACTIVITIES: CPT | Performed by: PHYSICAL THERAPIST

## 2020-09-02 PROCEDURE — 97140 MANUAL THERAPY 1/> REGIONS: CPT | Performed by: PHYSICAL THERAPIST

## 2020-09-02 PROCEDURE — 97110 THERAPEUTIC EXERCISES: CPT | Performed by: PHYSICAL THERAPIST

## 2020-09-02 PROCEDURE — 97112 NEUROMUSCULAR REEDUCATION: CPT | Performed by: PHYSICAL THERAPIST

## 2020-09-02 NOTE — PROGRESS NOTES
Daily Note     Today's date: 2020  Patient name: Maico Camargo  : 1985  MRN: [id number]  Referring provider: Chris Mckenzie PA-C  Dx:   Encounter Diagnosis     ICD-10-CM    1  Acute bilateral thoracic back pain  M54 6                   Subjective: Kentrell Alvarez reports that he is just feeling stiff without significant pain  Reports his work restrictions are mostly from lack of bending ability per his work restrictions       Objective: See treatment diary below      Assessment: Tolerated treatment well  Patient did well with bending form and proper lumbar hip dissociation  NO issues with lifting with light weight and repetitive bending  Progress as able to increase tolerance      Plan: Continue per plan of care        Precautions: Hx Laminectomy L4 2018      Manuals 7/22 7/27 8/5 8/10 8/12 8/18 8/21 8/25 8/28 9/2   Seated thoracic distraction thrust  AF       np     Scapular mobility   CK KO     np     Thoracic Pas  Gr III-IV NP Gr III Gr III AF CK CK     Thoracic pistol thrust    CK CK AF with R rotation  np     Sidelying thoracolumbar rotation          AF   Nerve glide series        CK CK    Neuro Re-Ed             SL hip hinge  CK   30 2x10   np 15ea    Modified deadlift  Step, yellow KB  Step  Yellow  KB  2x10 20, 10# 20, 20#   20  PVC only  25X2   Bird dog    5s x10 5ea, 5s   np                  Ther Ex             TM incline walk          5%  5min    Quadruped rocking   CK w/ soft tisue mob KO np np Thoracic rotation  20  Thoracic extension  20  np  10 sec  X10   Arm extension  Red, 15x 5s hold Red  2x10 Blue, 20 np   np 2x15  10#    Lat stretch    3x30s ea np  physioball, 20ea 10s x 5 ea 3x30s    Cat / cow    20 20  20 np     Prone row      30  np     Seated hamstring stretch        3x30s 3x30s    KB sit stands          20  Blue   Prone quad/HF stretch         3x30s  Green strap    Ther Activity             Box lifts          30   Cart push          75#  5 laps 25Ft    Gait Training Modalities

## 2020-09-04 ENCOUNTER — OFFICE VISIT (OUTPATIENT)
Dept: PHYSICAL THERAPY | Facility: MEDICAL CENTER | Age: 35
End: 2020-09-04
Payer: OTHER MISCELLANEOUS

## 2020-09-04 DIAGNOSIS — M54.6 ACUTE BILATERAL THORACIC BACK PAIN: Primary | ICD-10-CM

## 2020-09-04 PROCEDURE — 97140 MANUAL THERAPY 1/> REGIONS: CPT | Performed by: PHYSICAL THERAPIST

## 2020-09-04 PROCEDURE — 97530 THERAPEUTIC ACTIVITIES: CPT | Performed by: PHYSICAL THERAPIST

## 2020-09-04 PROCEDURE — 97110 THERAPEUTIC EXERCISES: CPT | Performed by: PHYSICAL THERAPIST

## 2020-09-04 PROCEDURE — 97112 NEUROMUSCULAR REEDUCATION: CPT | Performed by: PHYSICAL THERAPIST

## 2020-09-04 NOTE — PROGRESS NOTES
PT Re-Evaluation     Today's date: 2020  Patient name: Vance Dawson  : 1985  MRN: [id number]  Referring provider: Wanda Vega PA-C  Dx:   Encounter Diagnosis     ICD-10-CM    1  Acute bilateral thoracic back pain  M54 6                   Assessment  Assessment details: Vance Dawson is a 30 y o  male was evaluated on 2020  for Acute bilateral thoracic back pain  He has attended therapy consistently and has seen a reduction and resolution of much of his back pain associated with bending and lifting  Has been able to return to lifting and pushing in clinic without any complaints of back pain  Mobility has returned to pre injury levels and activity at home has returned to normal    Patient follows up with MD next week and seems to be appropriate to return to bending and lifting at work due to current functional status  Impairments: abnormal muscle firing, abnormal muscle tone, abnormal or restricted ROM, impaired physical strength, lacks appropriate home exercise program and pain with function  Understanding of Dx/Px/POC: good   Prognosis: good    Goals  Patient will successfully transition to home exercise program   Patient will be able to manage symptoms independently      Alfonso Canseco will report sleeping through night and not waking due to back pain  Patient will return to work activity in 4-6 weeks without limitation in lifting tolerance       Plan  Patient would benefit from: skilled PT  Referral necessary: No  Planned modality interventions: thermotherapy: hydrocollator packs  Planned therapy interventions: home exercise program, manual therapy, neuromuscular re-education, patient education, functional ROM exercises, strengthening, stretching, joint mobilization, graded activity, graded exercise, therapeutic exercise, body mechanics training, motor coordination training and activity modification  Frequency: 2x week  Duration in weeks: 12  Treatment plan discussed with: patient        Subjective Evaluation    History of Present Illness  Mechanism of injury: George Rubio is a 30 y o  male presenting to therapy with complaints of mid/lower back pain after lifting on   He reports he lifted and object and his back began tightening up  He denies any lower extremity symptoms, no numbness, no bowel/bladder dysfunction  Has been attempting to manage with muscle relaxers and stretching  Hx of laminectomy 2 years ago at L4  Feels most of discomfort and tightness in mid back with laying on back or when being active  Currently out of work as  due to pain and not being able to fully perform duties  Update   Patient reports just overall soreness from using muscle but no back pain    He reports no issues with daily life at this point   Pain  Current pain ratin  At best pain ratin  At worst pain ratin  Quality: dull ache and pressure          Objective     Concurrent Complaints  Negative for night pain, disturbed sleep, bladder dysfunction, bowel dysfunction and saddle (S4) numbness    Postural Observations  Seated posture: fair  Standing posture: fair        Neurological Testing     Sensation     Lumbar   Left   Intact: light touch    Right   Intact: light touch    Reflexes   Left   Patellar (L4): normal (2+)  Achilles (S1): normal (2+)    Right   Patellar (L4): normal (2+)  Achilles (S1): normal (2+)    Active Range of Motion   Cervical/Thoracic Spine       Thoracic    Flexion:  Restriction level: moderate  Extension:  Restriction level: minimal  Left lateral flexion:  Restriction level: minimal  Right lateral flexion:  Restriction level: minimal  Left rotation:  Restriction level: minimal  Right rotation:  Restriction level: minimal    Lumbar   Flexion:  Restriction level: minimal  Extension:  Restriction level: minimal  Left lateral flexion:  Restriction level: minimal  Right lateral flexion:  Restriction level: minimal    Joint Play Hypomobile: T10, T11, T12, L1 and L2     Tests     Lumbar     Left   Negative crossed SLR, passive SLR and quadrant  Right   Negative crossed SLR, passive SLR and quadrant  Left Hip   Negative KVNG and FADIR  Right Hip   Negative KVNG and FADIR                Precautions: Hx Laminectomy L4 2018      Manuals 7/22            Seated thoracic distraction thrust  AF                                                   Neuro Re-Ed                                                                                                        Ther Ex             Double knee to chest             Quadruped rocking                                                                                            Ther Activity                                       Gait Training                                       Modalities

## 2020-09-11 ENCOUNTER — APPOINTMENT (OUTPATIENT)
Dept: URGENT CARE | Facility: MEDICAL CENTER | Age: 35
End: 2020-09-11
Payer: OTHER MISCELLANEOUS

## 2020-09-11 PROCEDURE — 99213 OFFICE O/P EST LOW 20 MIN: CPT | Performed by: FAMILY MEDICINE

## 2020-09-30 ENCOUNTER — OFFICE VISIT (OUTPATIENT)
Dept: URGENT CARE | Facility: MEDICAL CENTER | Age: 35
End: 2020-09-30
Payer: COMMERCIAL

## 2020-09-30 VITALS
OXYGEN SATURATION: 97 % | BODY MASS INDEX: 25.73 KG/M2 | RESPIRATION RATE: 16 BRPM | TEMPERATURE: 98.6 F | WEIGHT: 190 LBS | SYSTOLIC BLOOD PRESSURE: 143 MMHG | DIASTOLIC BLOOD PRESSURE: 91 MMHG | HEART RATE: 78 BPM | HEIGHT: 72 IN

## 2020-09-30 DIAGNOSIS — S29.012A STRAIN OF THORACIC BACK REGION: Primary | ICD-10-CM

## 2020-09-30 PROCEDURE — G0382 LEV 3 HOSP TYPE B ED VISIT: HCPCS | Performed by: PHYSICIAN ASSISTANT

## 2020-09-30 RX ORDER — MELOXICAM 15 MG/1
15 TABLET ORAL DAILY
Qty: 30 TABLET | Refills: 0 | Status: SHIPPED | OUTPATIENT
Start: 2020-09-30

## 2020-09-30 RX ORDER — NAPROXEN 500 MG/1
500 TABLET ORAL 2 TIMES DAILY
COMMUNITY
Start: 2020-08-25

## 2020-09-30 RX ORDER — METHOCARBAMOL 750 MG/1
750 TABLET, FILM COATED ORAL
COMMUNITY
Start: 2020-08-25

## 2020-09-30 RX ORDER — CYCLOBENZAPRINE HYDROCHLORIDE 7.5 MG/1
7.5 TABLET, FILM COATED ORAL
Qty: 14 TABLET | Refills: 0 | Status: SHIPPED | OUTPATIENT
Start: 2020-09-30

## 2020-09-30 RX ORDER — LIDOCAINE 50 MG/G
1 PATCH TOPICAL DAILY
Qty: 6 PATCH | Refills: 0 | Status: SHIPPED | OUTPATIENT
Start: 2020-09-30

## 2020-09-30 NOTE — PROGRESS NOTES
[address]        NAME: Maico Camargo is a 37 y o  male  : 1985    MRN: [id number]  DATE: 2020  TIME: 2:36 PM    Assessment and Plan   Strain of thoracic back region [S29 012A]  1  Strain of thoracic back region  cyclobenzaprine (FEXMID) 7 5 MG tablet    meloxicam (MOBIC) 15 mg tablet    lidocaine (LIDODERM) 5 %         Patient Instructions     Prescribed flexeril  Would like to try to change his muscle relaxer since he states robaxin is no longer working  Advised to take at bed time  Do not drive or operate machinery while taking  Prescribed meloxicam  Advised to no longer take naproxen as well  Can take tylenol as needed for additional pain relief  Take meloxicam once daily with food  Prescribed lidoderm patches topically  Apply for 12 hours on and then 12 hours off  Advised to follow-up with PCP for MRI or once work comp claim is re-opened, an MRI can be ordered  Proceed to  ER if symptoms worsen  Chief Complaint     Chief Complaint   Patient presents with    Back Pain     Patient relates he injured upper back middle under shoulder blades at work on 2020  He was following up here for occupational medicine for a while and discharged  He was out of work for 10 weeks and completed physical therapy  He is currently working regular duty  C/O same back  Feels he needs new medication to help with back pain  Prescribed Naproxen and Methocarbamol  No new injury  Per work comp he needs to go through his medical insurance  History of Present Illness       Patient is a 37-year-old male presenting for thoracic back pain  Injury originally occurred on 2020  Patient was following with occupational medicine  Was discharged on 2020  Patient states that since being discharged but, his pain has remained the same or increased  He rates his pain 6/10 at rest and it raises to 9/10 while at work    States the pain is located in his middle of his back on left and right sides   Denies numbness, tingling, bladder/bowel incontinence, saddle anesthesia, gait disturbance  Patient was prescribed naproxen and methocarbamol while under workman's comp  He states that neither of those medications are helping anymore  Patient attended physical therapy through work comp as well  States he finished up with physical therapy as well  Relates he called his  and they may be opening his claim up  However, the pain was so bad that he decided to come in as an urgent care for symptomatic treatment  Review of Systems   Review of Systems   Constitutional: Negative for activity change, appetite change, chills and fever  Musculoskeletal: Positive for back pain  Negative for gait problem, neck pain and neck stiffness  Skin: Negative for color change and wound  Neurological: Negative for dizziness, syncope, weakness, numbness and headaches           Current Medications       Current Outpatient Medications:     methocarbamol (ROBAXIN) 750 mg tablet, Take 750 mg by mouth daily at bedtime, Disp: , Rfl:     naproxen (NAPROSYN) 500 mg tablet, Take 500 mg by mouth 2 (two) times a day, Disp: , Rfl:     cyclobenzaprine (FEXMID) 7 5 MG tablet, Take 1 tablet (7 5 mg total) by mouth daily at bedtime as needed for muscle spasms, Disp: 14 tablet, Rfl: 0    ibuprofen (MOTRIN) 600 mg tablet, Take 600 mg by mouth every 6 (six) hours as needed, Disp: , Rfl:     lidocaine (LIDODERM) 5 %, Apply 1 patch topically daily Remove & Discard patch within 12 hours or as directed by MD, Disp: 6 patch, Rfl: 0    meloxicam (MOBIC) 15 mg tablet, Take 1 tablet (15 mg total) by mouth daily, Disp: 30 tablet, Rfl: 0    methylPREDNISolone 4 MG tablet therapy pack, Use as directed on package (Patient not taking: Reported on 7/9/2020), Disp: 1 each, Rfl: 0    Current Allergies     Allergies as of 09/30/2020    (No Known Allergies)            The following portions of the patient's history were reviewed and updated as appropriate: allergies, current medications, past family history, past medical history, past social history, past surgical history and problem list      History reviewed  No pertinent past medical history  Past Surgical History:   Procedure Laterality Date    BACK SURGERY         No family history on file  Medications have been verified  Objective   /91   Pulse 78   Temp 98 6 °F (37 °C) (Tympanic)   Resp 16   Ht 6' (1 829 m)   Wt 86 2 kg (190 lb)   SpO2 97%   BMI 25 77 kg/m²        Physical Exam     Physical Exam  Vitals signs and nursing note reviewed  Constitutional:       General: He is not in acute distress  Appearance: Normal appearance  He is not ill-appearing or toxic-appearing  HENT:      Head: Normocephalic and atraumatic  Neck:      Musculoskeletal: Normal range of motion and neck supple  No neck rigidity or muscular tenderness  Cardiovascular:      Rate and Rhythm: Normal rate and regular rhythm  Pulses: Normal pulses  Heart sounds: Normal heart sounds  No murmur  No friction rub  No gallop  Pulmonary:      Effort: Pulmonary effort is normal  No respiratory distress  Breath sounds: Normal breath sounds  No stridor  No wheezing or rhonchi  Musculoskeletal:      Thoracic back: He exhibits decreased range of motion, tenderness and spasm  He exhibits no bony tenderness, no swelling and no edema  Comments: Pt has paraspinal TTP b/l in thoracic region  No midline TTP  UE strength 5/5  LE strength 5/5  Heel-toe raise intact  Great toe flexion and extension intact  Sensation grossly intact  AROM limited secondary to pain  Patient performs flexion to 50 degrees  Extension, lateral bending, and rotation intact  Lymphadenopathy:      Cervical: No cervical adenopathy  Skin:     Findings: No bruising or erythema  Neurological:      General: No focal deficit present        Mental Status: He is alert and oriented to person, place, and time    Psychiatric:         Mood and Affect: Mood normal          Behavior: Behavior normal

## 2020-09-30 NOTE — PATIENT INSTRUCTIONS
Thoracic Back Strain   WHAT YOU NEED TO KNOW:   A thoracic back strain is a muscle or tendon injury in your upper or middle back  You may have pain, muscle spasms, swelling, or stiffness  The strain may be caused by a back injury, poor posture, or lifting a heavy object  Too much activity can also cause a strain  A mild strain may cause minor pain that goes away in a few days  A more severe strain may cause the muscle or tendon to tear  There is a very small chance you may need surgery to fix the tear  DISCHARGE INSTRUCTIONS:   Medicines: You may need any of the following:  · Prescription pain medicine  may be given  Ask how to take this medicine safely  Do not wait until the pain is severe to take your medicine  · NSAIDs , such as ibuprofen, help decrease swelling, pain, and fever  This medicine is available with or without a doctor's order  NSAIDs can cause stomach bleeding or kidney problems in certain people  If you take blood thinner medicine, always ask your healthcare provider if NSAIDs are safe for you  Always read the medicine label and follow directions  · Muscle relaxers  help prevent or treat spasms  · Take your medicine as directed  Contact your healthcare provider if you think your medicine is not helping or if you have side effects  Tell him or her if you are allergic to any medicine  Keep a list of the medicines, vitamins, and herbs you take  Include the amounts, and when and why you take them  Bring the list or the pill bottles to follow-up visits  Carry your medicine list with you in case of an emergency  Call 911 for any of the following:   · You have chest pain or shortness of breath  Return to the emergency department if:   · You have severe pain, or pain that spreads from your back to other areas  · You have new or increased swelling or redness in the injured area  Contact your healthcare provider if:   · You have questions or concerns about your condition or care      Follow up with your healthcare provider as directed: You may need more tests to check for other injuries or to see how your injury is healing  You may also need to see a specialist  Write down your questions so you remember to ask them during your visits  Self-care:   · Rest as directed  Move slowly and carefully  Do not lift heavy objects  · Apply ice or heat as directed  Ice decreases pain and swelling and may help decrease tissue damage  Heat helps decrease muscle spasms  Your healthcare provider may tell you to apply only ice for the first 24 hours to help reduce swelling  Apply ice or heat to the area for 20 minutes every hour, or as directed  Ask how many times to do this each day, and for how many days  · Use an elastic wrap or back brace as directed  These will help keep the injured area from moving so it can heal      · Go to physical therapy as directed  A physical therapist can teach you exercises to help strengthen your back  They can also teach you safe ways to bend and move so you do not cause more injury  Prevent another thoracic back strain:   · Lift objects carefully  Ask someone to help you lift heavy objects  If you must lift an object by yourself, do not use your back muscles to lift  Lift with your legs  · Check your posture  Keep your upper body lifted and your head up  Poor posture can cause back strain or make it worse  Adjust your position if you work in front of a computer  You may need arm or wrist supports or change the height of your chair  · Exercise as directed  Exercise can help strengthen your muscles and make you more flexible  Do not exercise or play sports when you are tired  Always warm up before you start and cool down when you finish  · Stretch your muscles as directed  Keep your muscles limber by stretching every day  Stretch before you exercise    © 2017 [employer] Information is for End User's use only and may not be sold, redistributed or otherwise used for commercial purposes  All illustrations and images included in CareNotes® are the copyrighted property of [employer]  or [employer]  The above information is an  only  It is not intended as medical advice for individual conditions or treatments  Talk to your doctor, nurse or pharmacist before following any medical regimen to see if it is safe and effective for you

## 2020-10-05 ENCOUNTER — APPOINTMENT (OUTPATIENT)
Dept: URGENT CARE | Facility: CLINIC | Age: 35
End: 2020-10-05
Payer: OTHER MISCELLANEOUS

## 2020-10-05 PROCEDURE — 99213 OFFICE O/P EST LOW 20 MIN: CPT

## 2023-12-04 ENCOUNTER — OCCMED (OUTPATIENT)
Dept: URGENT CARE | Facility: CLINIC | Age: 38
End: 2023-12-04

## 2023-12-04 DIAGNOSIS — Z02.83 ENCOUNTER FOR DRUG SCREENING: Primary | ICD-10-CM
